# Patient Record
Sex: MALE | Race: WHITE | NOT HISPANIC OR LATINO
[De-identification: names, ages, dates, MRNs, and addresses within clinical notes are randomized per-mention and may not be internally consistent; named-entity substitution may affect disease eponyms.]

---

## 2019-04-05 ENCOUNTER — RECORDS - HEALTHEAST (OUTPATIENT)
Dept: ADMINISTRATIVE | Facility: OTHER | Age: 44
End: 2019-04-05

## 2019-04-16 ENCOUNTER — HOSPITAL ENCOUNTER (OUTPATIENT)
Dept: NUCLEAR MEDICINE | Facility: HOSPITAL | Age: 44
Discharge: HOME | End: 2019-04-16
Attending: PHYSICIAN ASSISTANT

## 2019-04-16 ENCOUNTER — HOSPITAL ENCOUNTER (OUTPATIENT)
Dept: NUCLEAR MEDICINE | Facility: HOSPITAL | Age: 44
Discharge: HOME | End: 2019-04-16
Attending: PHYSICIAN ASSISTANT | Admitting: RADIOLOGY

## 2019-04-16 DIAGNOSIS — R11.2 NAUSEA AND VOMITING, INTRACTABILITY OF VOMITING NOT SPECIFIED, UNSPECIFIED VOMITING TYPE: ICD-10-CM

## 2024-10-29 ENCOUNTER — TRANSCRIBE ORDERS (OUTPATIENT)
Dept: ADMINISTRATIVE | Facility: HOSPITAL | Age: 49
End: 2024-10-29
Payer: COMMERCIAL

## 2024-10-29 ENCOUNTER — HOSPITAL ENCOUNTER (OUTPATIENT)
Dept: GENERAL RADIOLOGY | Facility: HOSPITAL | Age: 49
Discharge: HOME | End: 2024-10-29
Admitting: NURSE PRACTITIONER
Payer: COMMERCIAL

## 2024-10-29 DIAGNOSIS — M47.816 LUMBAR SPONDYLOSIS: ICD-10-CM

## 2024-10-29 DIAGNOSIS — M47.816 LUMBAR SPONDYLOSIS: Primary | ICD-10-CM

## 2024-10-29 PROCEDURE — 72100 X-RAY EXAM L-S SPINE 2/3 VWS: CPT

## 2024-12-20 ENCOUNTER — OFFICE VISIT (OUTPATIENT)
Dept: PODIATRY | Facility: CLINIC | Age: 49
End: 2024-12-20
Payer: COMMERCIAL

## 2024-12-20 VITALS
SYSTOLIC BLOOD PRESSURE: 133 MMHG | WEIGHT: 152 LBS | OXYGEN SATURATION: 97 % | DIASTOLIC BLOOD PRESSURE: 87 MMHG | BODY MASS INDEX: 23.86 KG/M2 | HEIGHT: 67 IN | HEART RATE: 56 BPM | TEMPERATURE: 97.6 F

## 2024-12-20 DIAGNOSIS — G89.29 CHRONIC PAIN OF LEFT KNEE: Primary | ICD-10-CM

## 2024-12-20 DIAGNOSIS — M25.562 CHRONIC PAIN OF LEFT KNEE: Primary | ICD-10-CM

## 2024-12-20 DIAGNOSIS — M72.2 PLANTAR FASCIITIS: ICD-10-CM

## 2024-12-20 RX ORDER — METFORMIN HYDROCHLORIDE 500 MG/1
TABLET, EXTENDED RELEASE ORAL EVERY 24 HOURS
COMMUNITY

## 2024-12-20 RX ORDER — FENOFIBRATE 145 MG/1
1 TABLET, COATED ORAL DAILY
COMMUNITY
Start: 2024-10-22 | End: 2025-01-20

## 2024-12-20 RX ORDER — ROSUVASTATIN CALCIUM 20 MG/1
TABLET, COATED ORAL EVERY 24 HOURS
COMMUNITY
Start: 2024-10-22

## 2024-12-20 RX ORDER — LEVOTHYROXINE SODIUM 75 UG/1
TABLET ORAL EVERY 24 HOURS
COMMUNITY
Start: 2024-10-22

## 2024-12-20 RX ORDER — PROPRANOLOL HYDROCHLORIDE 60 MG/1
CAPSULE, EXTENDED RELEASE ORAL EVERY 24 HOURS
COMMUNITY
Start: 2024-10-29

## 2024-12-20 RX ORDER — LISINOPRIL 20 MG/1
1 TABLET ORAL DAILY
COMMUNITY

## 2024-12-23 NOTE — PROGRESS NOTES
[facility] - PODIATRY    Today's Date: 12/23/24    Patient Name: Meng Villegas  MRN: [id number]  CSN: [id number]  PCP: Vinh Theodore MD,  Referring Provider: Abby Delacruz,*    SUBJECTIVE     Chief Complaint   Patient presents with    Right Foot - Establish Care     Patient here today for new shoe insoles which were prescribed in [place] 1 yr ago    Left Foot - Establish Care     HPI: Meng Villegas, a 49 y.o.male, presents to clinic.    Patient is a 49-year-old male presenting with bilateral foot issues.  Patient states that he has had insoles in the past that are custom.  Patient says that they have helped with his foot pain.  Patient states that they are worn out at this time and he would like new ones.  Patient states he does have some left knee pain that he thinks contributes to his left foot pain.  He would like to see orthopedics for this.    Past Medical History:   Diagnosis Date    Diabetes      Past Surgical History:   Procedure Laterality Date    KNEE SURGERY Left     meniscus repair     History reviewed. No pertinent family history.  Social History     Socioeconomic History    Marital status: Single   Tobacco Use    Smoking status: Former     Types: Cigarettes    Smokeless tobacco: Former   Vaping Use    Vaping status: Former   Substance and Sexual Activity    Alcohol use: Never    Drug use: Never    Sexual activity: Defer     Allergies   Allergen Reactions    Codeine GI Intolerance     Current Outpatient Medications   Medication Sig Dispense Refill    fenofibrate (TRICOR) 145 MG tablet Take 1 tablet by mouth Daily.      levothyroxine (SYNTHROID, LEVOTHROID) 75 MCG tablet Daily.      lisinopril (PRINIVIL,ZESTRIL) 20 MG tablet Take 1 tablet by mouth Daily.      metFORMIN ER (GLUCOPHAGE-XR) 500 MG 24 hr tablet Daily.      propranolol LA (INDERAL LA) 60 MG 24 hr capsule Daily.      Riboflavin (VITAMIN B-2 PO) Vitamin B2      rosuvastatin (CRESTOR) 20 MG tablet  "Daily.       No current facility-administered medications for this visit.     Review of Systems   Constitutional: Negative.    Musculoskeletal:         Bilateral heel pain   All other systems reviewed and are negative.      OBJECTIVE     Vitals:    12/20/24 1142   BP: 133/87   Pulse: 56   Temp: 97.6 °F (36.4 °C)   SpO2: 97%       No results found for: \"WBC\", \"RBC\", \"HGB\", \"HCT\", \"MCV\", \"MCH\", \"MCHC\", \"RDW\", \"RDWSD\", \"MPV\", \"PLT\", \"NEUTRORELPCT\", \"LYMPHORELPCT\", \"MONORELPCT\", \"EOSRELPCT\", \"BASORELPCT\", \"AUTOIGPER\", \"NEUTROABS\", \"LYMPHSABS\", \"MONOSABS\", \"EOSABS\", \"BASOSABS\", \"AUTOIGNUM\", \"NRBC\"      No results found for: \"GLUCOSE\", \"BUN\", \"CREATININE\", \"EGFRIFNONA\", \"EGFRIFAFRI\", \"BCR\", \"K\", \"CO2\", \"CALCIUM\", \"PROTENTOTREF\", \"ALBUMIN\", \"LABIL2\", \"BILIRUBIN\", \"AST\", \"ALT\"    Patient seen in no apparent distress.      PHYSICAL EXAM:     Foot/Ankle Exam    GENERAL  Appearance:  appears stated age  Orientation:  AAOx3  Affect:  appropriate  Gait:  unimpaired  Assistance:  independent  Right shoe gear: casual shoe  Left shoe gear: casual shoe    VASCULAR     Right Foot Vascularity   Normal vascular exam    Dorsalis pedis:  2+  Posterior tibial:  2+  Skin temperature:  warm  Edema grading:  None  CFT:  < 3 seconds  Pedal hair growth:  Present  Varicosities:  none     Left Foot Vascularity   Normal vascular exam    Dorsalis pedis:  2+  Posterior tibial:  2+  Skin temperature:  warm  Edema grading:  None  CFT:  < 3 seconds  Pedal hair growth:  Present  Varicosities:  none     NEUROLOGIC     Right Foot Neurologic   Normal sensation    Light touch sensation: normal  Vibratory sensation: normal  Hot/Cold sensation: normal     Left Foot Neurologic   Normal sensation    Light touch sensation: normal  Vibratory sensation: normal  Hot/Cold sensation:  normal    MUSCULOSKELETAL     Right Foot Musculoskeletal   Ecchymosis:  none  Tenderness:  plantar fascia tenderness       Left Foot Musculoskeletal   Ecchymosis:  none  Tenderness:  " plantar fascia tenderness    MUSCLE STRENGTH     Right Foot Muscle Strength   Foot dorsiflexion:  4  Foot plantar flexion:  4  Foot inversion:  4  Foot eversion:  4     Left Foot Muscle Strength   Foot dorsiflexion:  4  Foot plantar flexion:  4  Foot inversion:  4  Foot eversion:  4    RANGE OF MOTION     Right Foot Range of Motion   Foot and ankle ROM within normal limits       Left Foot Range of Motion   Foot and ankle ROM within normal limits      DERMATOLOGIC      Right Foot Dermatologic   Skin  Right foot skin is intact.   Nails comment:  Toenails 1, 2, 3, 4, and 5     Left Foot Dermatologic   Skin  Left foot skin is intact.   Nails comment:  Toenails 1, 2, 3, 4, and 5      RADIOLOGY:          No results found.    ASSESSMENT/PLAN     Diagnoses and all orders for this visit:    1. Chronic pain of left knee (Primary)  -     Ambulatory Referral to Orthopedic Surgery    2. Plantar fasciitis  -     Ambulatory Referral For Orthotics        Comprehensive lower extremity examination and evaluation was performed.    Discussed findings and treatment plan including risks, benefits, and treatment options with patient in detail. Patient agreed with treatment plan.    Patient to begin stretching exercises and icing in the evening as tolerated. Discussed compression therapy and resting the extremity.  Anti-inflammatory medication to begin taking if okay by PCP.    Prescription for custom orthotics given, referral to orthopedic surgery for chronic knee pain.    Medications and allergies reviewed.  Reviewed available lab values along with other pertinent labs.  These were discussed with the patient.    An After Visit Summary was printed and given to the patient at discharge, including (if requested) any available informative/educational handouts regarding diagnosis, treatment, or medications. All questions were answered to patient/family satisfaction. Should symptoms fail to improve or worsen they agree to call or return to  clinic or to go to the Emergency Department. Discussed the importance of following up with any needed screening tests/labs/specialist appointments and any requested follow-up recommended by me today. Importance of maintaining follow-up discussed and patient accepts that missed appointments can delay diagnosis and potentially lead to worsening of conditions.    No follow-ups on file., or sooner if acute issues arise.    This document has been electronically signed by Vasiliy Napoles DPM on December 23, 2024 08:10 EST